# Patient Record
Sex: FEMALE | Race: WHITE
[De-identification: names, ages, dates, MRNs, and addresses within clinical notes are randomized per-mention and may not be internally consistent; named-entity substitution may affect disease eponyms.]

---

## 2019-06-05 NOTE — NUR
06/05/19 Jaylan1 Kun Steiner
PROCEDURE WAS CANCELLED DUE TO EQUIPTMENT BROKEN. PATIENT'S IV WAS
DC'D. YVONNE MEDRANO STATED THE EQUIPTMENT HAS BEEN FIXED AND DR HAWTHORNE HAS
APPROVED TO CONTINUE WITH PROCEDURE AS SCHEDULED. NEW IV WAS STARTED.

## 2020-10-21 ENCOUNTER — HOSPITAL ENCOUNTER (OUTPATIENT)
Dept: HOSPITAL 95 - LAB | Age: 75
Discharge: HOME | End: 2020-10-21
Attending: INTERNAL MEDICINE
Payer: COMMERCIAL

## 2020-10-21 DIAGNOSIS — E78.5: Primary | ICD-10-CM

## 2020-10-21 DIAGNOSIS — D64.9: ICD-10-CM

## 2021-03-24 ENCOUNTER — HOSPITAL ENCOUNTER (OUTPATIENT)
Dept: HOSPITAL 95 - ORSCSDS | Age: 76
Discharge: HOME | End: 2021-03-24
Attending: INTERNAL MEDICINE
Payer: COMMERCIAL

## 2021-03-24 VITALS — BODY MASS INDEX: 29.2 KG/M2 | WEIGHT: 181.66 LBS | HEIGHT: 65.98 IN

## 2021-03-24 DIAGNOSIS — K21.9: ICD-10-CM

## 2021-03-24 DIAGNOSIS — D12.2: ICD-10-CM

## 2021-03-24 DIAGNOSIS — C18.7: ICD-10-CM

## 2021-03-24 DIAGNOSIS — I10: ICD-10-CM

## 2021-03-24 DIAGNOSIS — K29.70: ICD-10-CM

## 2021-03-24 DIAGNOSIS — Z87.891: ICD-10-CM

## 2021-03-24 DIAGNOSIS — K92.1: Primary | ICD-10-CM

## 2021-03-24 DIAGNOSIS — K76.0: ICD-10-CM

## 2021-03-24 DIAGNOSIS — R11.2: ICD-10-CM

## 2021-03-24 DIAGNOSIS — Z79.899: ICD-10-CM

## 2021-03-24 PROCEDURE — 0DB78ZX EXCISION OF STOMACH, PYLORUS, VIA NATURAL OR ARTIFICIAL OPENING ENDOSCOPIC, DIAGNOSTIC: ICD-10-PCS | Performed by: INTERNAL MEDICINE

## 2021-03-24 PROCEDURE — 0DBN8ZX EXCISION OF SIGMOID COLON, VIA NATURAL OR ARTIFICIAL OPENING ENDOSCOPIC, DIAGNOSTIC: ICD-10-PCS | Performed by: INTERNAL MEDICINE

## 2021-03-24 PROCEDURE — 0DBP8ZX EXCISION OF RECTUM, VIA NATURAL OR ARTIFICIAL OPENING ENDOSCOPIC, DIAGNOSTIC: ICD-10-PCS | Performed by: INTERNAL MEDICINE

## 2021-03-24 PROCEDURE — 0DBK8ZX EXCISION OF ASCENDING COLON, VIA NATURAL OR ARTIFICIAL OPENING ENDOSCOPIC, DIAGNOSTIC: ICD-10-PCS | Performed by: INTERNAL MEDICINE

## 2021-03-24 PROCEDURE — 0DB48ZX EXCISION OF ESOPHAGOGASTRIC JUNCTION, VIA NATURAL OR ARTIFICIAL OPENING ENDOSCOPIC, DIAGNOSTIC: ICD-10-PCS | Performed by: INTERNAL MEDICINE

## 2021-03-24 PROCEDURE — 0DBE8ZX EXCISION OF LARGE INTESTINE, VIA NATURAL OR ARTIFICIAL OPENING ENDOSCOPIC, DIAGNOSTIC: ICD-10-PCS | Performed by: INTERNAL MEDICINE

## 2021-05-19 ENCOUNTER — HOSPITAL ENCOUNTER (INPATIENT)
Dept: HOSPITAL 95 - SURS | Age: 76
LOS: 2 days | Discharge: HOME | DRG: 331 | End: 2021-05-21
Attending: SURGERY | Admitting: SURGERY
Payer: MEDICARE

## 2021-05-19 VITALS — WEIGHT: 183.87 LBS | HEIGHT: 66 IN | BODY MASS INDEX: 29.55 KG/M2

## 2021-05-19 DIAGNOSIS — Z98.42: ICD-10-CM

## 2021-05-19 DIAGNOSIS — Z88.8: ICD-10-CM

## 2021-05-19 DIAGNOSIS — Z79.899: ICD-10-CM

## 2021-05-19 DIAGNOSIS — I10: ICD-10-CM

## 2021-05-19 DIAGNOSIS — Z98.41: ICD-10-CM

## 2021-05-19 DIAGNOSIS — Z90.49: ICD-10-CM

## 2021-05-19 DIAGNOSIS — Z98.890: ICD-10-CM

## 2021-05-19 DIAGNOSIS — Z90.89: ICD-10-CM

## 2021-05-19 DIAGNOSIS — Z87.891: ICD-10-CM

## 2021-05-19 DIAGNOSIS — C18.7: Primary | ICD-10-CM

## 2021-05-19 DIAGNOSIS — Z90.710: ICD-10-CM

## 2021-05-19 PROCEDURE — 0DTN4ZZ RESECTION OF SIGMOID COLON, PERCUTANEOUS ENDOSCOPIC APPROACH: ICD-10-PCS | Performed by: SURGERY

## 2021-05-19 PROCEDURE — A9270 NON-COVERED ITEM OR SERVICE: HCPCS

## 2021-05-19 NOTE — NUR
PT ARRIVED TO ROOM 231 FROM PACU PT IS S/P LAP COLECTOMY PT HAS 4 GAUZE SITES
WITH TEGADERM PLUS A RON SITE WITH DRESSING C/D/I PT RON HAS SERO/SANG DRAINAGE
EMPTIED 70 ML PT ID SLEEPING WAKES TO PHYSICAL AND VERBAL STIMULI FALLS BACK
TO SLEEP

## 2021-05-19 NOTE — NUR
MICHELLE AUSTIN OF [facility] GIVEN WITH PO MEDS NO NAUSEA AT THIS TIME PT ELEAZAR ICE CHIPS
EARLIER W/O DIFFICULTY SITTING UP IN BED

## 2021-05-19 NOTE — NUR
pt sleeping fent pca with pca only started will give pt the button with
instructions when she wakes up

## 2021-05-19 NOTE — NUR
pt awake prn mout care pt called her spouse pca given pt instructed on how to
use pt does not want to use it

## 2021-05-19 NOTE — NUR
ACTIVITY: PATIENT DANGLED FOR 15 MINUTES, NO DIZZINESS REPORTED. PAIN WENT
FROM 2/10 TO 4/10 THEN BACK DOWN TO 2/10. PATIENT THEN WAS ASSISTED TO THE
BATHROOM AND WAS ABLE TO BRUSH HER TEETH. NO INCREASE IN PAIN REPORTED WITH
AMBULATION. PAIN IS 2/10 WHEN BACK IN BED. PATIENT HAS NOT USED PCA PUMP SINCE
INTIATED.

## 2021-05-20 LAB
ANION GAP SERPL CALCULATED.4IONS-SCNC: 5 MMOL/L (ref 6–16)
BASOPHILS # BLD AUTO: 0.01 K/MM3 (ref 0–0.23)
BASOPHILS NFR BLD AUTO: 0 % (ref 0–2)
BUN SERPL-MCNC: 12 MG/DL (ref 8–24)
CALCIUM SERPL-MCNC: 7.5 MG/DL (ref 8.5–10.1)
CHLORIDE SERPL-SCNC: 107 MMOL/L (ref 98–108)
CO2 SERPL-SCNC: 28 MMOL/L (ref 21–32)
CREAT SERPL-MCNC: 0.96 MG/DL (ref 0.4–1)
DEPRECATED RDW RBC AUTO: 46.5 FL (ref 35.1–46.3)
EOSINOPHIL # BLD AUTO: 0.01 K/MM3 (ref 0–0.68)
EOSINOPHIL NFR BLD AUTO: 0 % (ref 0–6)
ERYTHROCYTE [DISTWIDTH] IN BLOOD BY AUTOMATED COUNT: 14.3 % (ref 11.7–14.2)
GLUCOSE SERPL-MCNC: 104 MG/DL (ref 70–99)
HCT VFR BLD AUTO: 31.9 % (ref 33–51)
HGB BLD-MCNC: 10.2 G/DL (ref 11.5–16)
IMM GRANULOCYTES # BLD AUTO: 0.02 K/MM3 (ref 0–0.1)
IMM GRANULOCYTES NFR BLD AUTO: 0 % (ref 0–1)
LYMPHOCYTES # BLD AUTO: 1.66 K/MM3 (ref 0.84–5.2)
LYMPHOCYTES NFR BLD AUTO: 17 % (ref 21–46)
MCHC RBC AUTO-ENTMCNC: 32 G/DL (ref 31.5–36.5)
MCV RBC AUTO: 88 FL (ref 80–100)
MONOCYTES # BLD AUTO: 0.78 K/MM3 (ref 0.16–1.47)
MONOCYTES NFR BLD AUTO: 8 % (ref 4–13)
NEUTROPHILS # BLD AUTO: 7.18 K/MM3 (ref 1.96–9.15)
NEUTROPHILS NFR BLD AUTO: 74 % (ref 41–73)
NRBC # BLD AUTO: 0 K/MM3 (ref 0–0.02)
NRBC BLD AUTO-RTO: 0 /100 WBC (ref 0–0.2)
PLATELET # BLD AUTO: 343 K/MM3 (ref 150–400)
POTASSIUM SERPL-SCNC: 3.4 MMOL/L (ref 3.5–5.5)
SODIUM SERPL-SCNC: 140 MMOL/L (ref 136–145)

## 2021-05-20 NOTE — NUR
SHIFT SUMMARY:
POD1 LAP SIG COLECTOMY. A&OX4. PT PLEASANT AND COOPERATIVE WITH CARE. PT
AMBULATING INDEPENDENTLY THROUGHOUT THE DAY. RON DRAIN RUQ DRAINED 100ML SS
FLUID. LAP SITES X4 C/D/I. PAYNE CATH DC, PT HAD BLOODY URINE W/CLOTS FIRST
VOID AFTER PAYNE WAS DC. CLEAR LIQUID DIET TOLERATED. NO PASSING OF FLATUS
YET. TYLENOL ADMINISTERED PER EMAR. NO C/O PAIN THROUGHOUT SHIFT. PT RESTING
IN BED. CALL LIGHT IN REACH.

## 2021-05-21 NOTE — NUR
1435
ASSUMED CARE FOR THIS PT. PT RESTING IN BED PEACEFULLY. DENIES PAIN, N/V.
AMBULATES INDEPENDENTLY TO BATHROOM, VOIDING REGULARLY. DENIES BM, PASSING
FLATUS. CHANGED DRESSING TO RON, EMPTIED 40ML OF SEROUSANGUINOUS FLUID, 4 LAP
SITES TO ABD C/D/I. CALL LIGHT WITHIN REACH. WILL CONTINUE TO MONITOR.

## 2021-05-21 NOTE — NUR
PT TO FAMILY VEHICLE VIA WHEELCHAIR BY ACE MASTERSON, ALL BELONGINGS & DISCHARGE
INSTRUCTIONS IN PT'S POSESSION.

## 2021-05-21 NOTE — NUR
SUMMARY
PT AMBULATES INDEPENDANTLY. VOIDS WITHOUT DIFF.PASSING SMALL AMNTS FLATUS AND
SCANT PINK TINGED MUCOUS FROM RECTUM.REPORTS PAIN WELL CONTROLLED WITH TYLENOL
PO.

## 2022-03-15 ENCOUNTER — HOSPITAL ENCOUNTER (OUTPATIENT)
Dept: HOSPITAL 95 - ORSCMMR | Age: 77
Discharge: HOME | End: 2022-03-15
Payer: COMMERCIAL

## 2022-03-15 VITALS — BODY MASS INDEX: 27.09 KG/M2 | WEIGHT: 172.62 LBS | HEIGHT: 67 IN

## 2022-03-15 DIAGNOSIS — E07.9: ICD-10-CM

## 2022-03-15 DIAGNOSIS — I10: ICD-10-CM

## 2022-03-15 DIAGNOSIS — C49.9: ICD-10-CM

## 2022-03-15 DIAGNOSIS — Z87.891: ICD-10-CM

## 2022-03-15 DIAGNOSIS — Z79.899: ICD-10-CM

## 2022-03-15 DIAGNOSIS — K76.0: ICD-10-CM

## 2022-03-15 DIAGNOSIS — Z85.038: Primary | ICD-10-CM

## 2022-03-15 PROCEDURE — 0DJD8ZZ INSPECTION OF LOWER INTESTINAL TRACT, VIA NATURAL OR ARTIFICIAL OPENING ENDOSCOPIC: ICD-10-PCS | Performed by: SURGERY

## 2022-03-15 NOTE — NUR
03/15/22 0940 Diana Patel
History, Chart, Medications and Allergies reviewed before start of
procedure. Patient confirms NPO status and agrees with scheduled
surgery. 3-LEAD EKG REVIEWED WITH PHYSICIAN PRIOR TO START OF
PROCEDURE. MONITOR INTACT WITH CONTINUOUS PULSE OXIMETRY AND
INTERMITTENT BP. PATIENT DETERMINED TO BE ASA APPROPRIATE FOR
PROPOFOL SEDATION PRIOR TO START OF PROCEDURE BY DR. STRATTON.

## 2022-05-20 ENCOUNTER — HOSPITAL ENCOUNTER (EMERGENCY)
Dept: HOSPITAL 95 - ER | Age: 77
Discharge: HOME | End: 2022-05-20
Payer: COMMERCIAL

## 2022-05-20 VITALS — WEIGHT: 170.99 LBS | HEIGHT: 66 IN | BODY MASS INDEX: 27.48 KG/M2

## 2022-05-20 DIAGNOSIS — E03.9: ICD-10-CM

## 2022-05-20 DIAGNOSIS — I10: ICD-10-CM
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
## 2022-05-20 DIAGNOSIS — Z79.899: ICD-10-CM

## 2022-05-20 DIAGNOSIS — Z87.891: ICD-10-CM

## 2022-05-20 DIAGNOSIS — S52.571A: Primary | ICD-10-CM

## 2022-05-20 DIAGNOSIS — Z88.8: ICD-10-CM

## 2022-05-20 PROCEDURE — A9270 NON-COVERED ITEM OR SERVICE: HCPCS

## 2022-05-27 ENCOUNTER — HOSPITAL ENCOUNTER (OUTPATIENT)
Dept: HOSPITAL 95 - ORSCMMR | Age: 77
Discharge: HOME | End: 2022-05-27
Payer: COMMERCIAL

## 2022-05-27 VITALS — BODY MASS INDEX: 27.88 KG/M2 | WEIGHT: 173.5 LBS | HEIGHT: 66 IN

## 2022-05-27 DIAGNOSIS — E03.9: ICD-10-CM

## 2022-05-27 DIAGNOSIS — Z79.899: ICD-10-CM

## 2022-05-27 DIAGNOSIS — I10: ICD-10-CM

## 2022-05-27 DIAGNOSIS — S52.571A: Primary | ICD-10-CM

## 2022-05-27 DIAGNOSIS — Z87.891: ICD-10-CM

## 2022-05-27 PROCEDURE — 0PSH04Z REPOSITION RIGHT RADIUS WITH INTERNAL FIXATION DEVICE, OPEN APPROACH: ICD-10-PCS

## 2022-05-27 PROCEDURE — A9270 NON-COVERED ITEM OR SERVICE: HCPCS

## 2022-05-27 PROCEDURE — C1713 ANCHOR/SCREW BN/BN,TIS/BN: HCPCS

## 2022-05-27 NOTE — NUR
History, Chart, Medications and Allergies reviewed before start of
procedure. Patient States Post-Procedure ride home has been arranged.
Patient reports completing Chlorhexadine shower X2 prior to admission to
hospital. Pt R wrist pain at 7 out of 10. R wrist elevated with pillow. Pt
agrees to discuss pain with Dr Miner during anesthesiologist this am. Ace wrap
and splint intact to R wrist. No clorhexadine prep done at this time.

## 2022-05-27 NOTE — NUR
PATIENT'S PLANNED SURGERY INVOLVES RUE WRIST. THREE RINGS NOTED TO RIGHT HAND
FINGERS AND UNABLE TO BE REMOVED. TIGHT ON FINGERS AND DIFFICULT TO TURN.
FINGER NOTED TO BE EDEMETOUS AND SKIN TISSUE DELICATE.
PATIENT AGREES TO HAVE REMOVED WITH RING CUTTER. ALL THREE RINGS REMOVED WITH
RING CUTTER FROM RIGHT HAND. PATIENT GIVEN BACK THE RINGS AND PLACED IN HER
PURSE.

## 2022-05-27 NOTE — NUR
PT IS ALERT, BREATHING RA, RESTING QUIETLY IN GURNEY, DENIES NAUSEA, COMPLAINS
OF 6/10 PAIN TO R WRIST. RUE ELEVATED ABOVE HEART ON PILLOW. FINGERS X5 WARM
WITH IMMEDIATE CAP REFILL. PT ABLE TO FEEL TOUCH TO MIDDLE FINGER, RING
FINGER, AND PINKY FINGER TO R HAND. PT ARRIVED WITH ICE TO SURGERY SITE,
REMOVED FOR 20 MINUTE BREAK. VSS. PT AGREES TO DRINK FLUIDS, PLAN FOR ORAL
ANALGESIC PRIOR TO D/C.

## 2022-05-27 NOTE — NUR
VSS. REPORTS PAIN LEVEL TO RIGHT WRIST DOWN TO 3/10. UP TO DRESS WITH STEADY
GAIT. ADDIE MEJÍA, CALLED FOR RIDE HOME. DISCUSSED DISCHARGE INSTRUCTIONS
WITH DAUGHTER PER PATIENT REQUEST. DAUGHTER ALSO NOTIFIED OF PRESCRIPTION TO
.

## 2022-05-27 NOTE — NUR
Discharge instructions reviewed with patient. Patient verbalizes understanding.
Copy given to patient to take home. Patient given a copy of Dr. Vazquez' Pain
Guideance handout.

## 2022-05-27 NOTE — NUR
WASTED 25MC[facility] FENTANYL IV WITH ARGENTINA SEXTON RN. PT HAD ALREADY BEEN
DISCHARGED IS WHY IT WASN'T DONE IN THE [facility].

## 2022-05-27 NOTE — NUR
NO CHANGES TO RUE ASSESSMENT. PATIENT ABLE TO FEEL TOUCH TO RIGHT PINKY, INDEX
AND MIDDLE FINGER. CAP REFILL IMMEDIATE X5 FINGERS. ICE RE-APPLIED TO RUE
SURGERY SITE. PATIENT MEDICATED FOR PAIN. TOLERATING PO FLUID. VSS.

## 2022-09-14 ENCOUNTER — HOSPITAL ENCOUNTER (OUTPATIENT)
Dept: HOSPITAL 95 - ORSCSDS | Age: 77
Discharge: HOME | End: 2022-09-14
Payer: COMMERCIAL

## 2022-09-14 VITALS — HEIGHT: 66 IN | BODY MASS INDEX: 28.2 KG/M2 | WEIGHT: 175.49 LBS

## 2022-09-14 DIAGNOSIS — E03.9: ICD-10-CM

## 2022-09-14 DIAGNOSIS — I10: ICD-10-CM

## 2022-09-14 DIAGNOSIS — F41.8: ICD-10-CM

## 2022-09-14 DIAGNOSIS — M67.432: Primary | ICD-10-CM

## 2022-09-14 DIAGNOSIS — Z87.891: ICD-10-CM

## 2022-09-14 DIAGNOSIS — Z79.899: ICD-10-CM

## 2022-09-14 PROCEDURE — 0LB60ZZ EXCISION OF LEFT LOWER ARM AND WRIST TENDON, OPEN APPROACH: ICD-10-PCS

## 2022-09-14 NOTE — NUR
09/14/22 1043 TERRA ANDRADE
PT STATES THAT SHE DOES NOT WANT ANY PAIN MEDICATION BUT WILL TAKE
TYLENOL. I INFORMED HER THAT DR USUALLY SEND RX TO PHARMACY THROUGH
E-SCRIPT

## 2022-09-14 NOTE — NUR
09/14/22 0853 GIBSON LOU
0.05MG OF EPI (1MG/1ML) ADDED TO 10MLS OF ROPIVACAINE 0.5% TO CREATE A
LOCAL SOLUTION OF ROPIVACAINE 0.5% WITH EPI 1:200,000.

## 2025-01-20 ENCOUNTER — HOSPITAL ENCOUNTER (OUTPATIENT)
Dept: HOSPITAL 95 - ATC | Age: 80
Discharge: HOME | End: 2025-01-20
Attending: INTERNAL MEDICINE
Payer: COMMERCIAL

## 2025-01-20 VITALS — SYSTOLIC BLOOD PRESSURE: 150 MMHG | DIASTOLIC BLOOD PRESSURE: 76 MMHG

## 2025-01-20 DIAGNOSIS — E78.5: ICD-10-CM

## 2025-01-20 DIAGNOSIS — Z87.891: ICD-10-CM

## 2025-01-20 DIAGNOSIS — D50.9: ICD-10-CM

## 2025-01-20 DIAGNOSIS — E03.9: ICD-10-CM

## 2025-01-20 DIAGNOSIS — R32: Primary | ICD-10-CM

## 2025-01-20 DIAGNOSIS — I10: ICD-10-CM

## 2025-01-20 DIAGNOSIS — E11.9: ICD-10-CM
